# Patient Record
Sex: FEMALE | Race: AMERICAN INDIAN OR ALASKA NATIVE
[De-identification: names, ages, dates, MRNs, and addresses within clinical notes are randomized per-mention and may not be internally consistent; named-entity substitution may affect disease eponyms.]

---

## 2019-08-24 ENCOUNTER — HOSPITAL ENCOUNTER (EMERGENCY)
Dept: HOSPITAL 5 - ED | Age: 19
Discharge: HOME | End: 2019-08-24
Payer: COMMERCIAL

## 2019-08-24 VITALS — SYSTOLIC BLOOD PRESSURE: 148 MMHG | DIASTOLIC BLOOD PRESSURE: 88 MMHG

## 2019-08-24 DIAGNOSIS — Z20.2: Primary | ICD-10-CM

## 2019-08-24 LAB
BILIRUB UR QL STRIP: (no result)
BLOOD UR QL VISUAL: (no result)
MUCOUS THREADS #/AREA URNS HPF: (no result) /HPF
PH UR STRIP: 6 [PH] (ref 5–7)
PROT UR STRIP-MCNC: (no result) MG/DL
RBC #/AREA URNS HPF: 1 /HPF (ref 0–6)
UROBILINOGEN UR-MCNC: < 2 MG/DL (ref ?–2)
WBC #/AREA URNS HPF: 2 /HPF (ref 0–6)

## 2019-08-24 PROCEDURE — 81001 URINALYSIS AUTO W/SCOPE: CPT

## 2019-08-24 PROCEDURE — 96372 THER/PROPH/DIAG INJ SC/IM: CPT

## 2019-08-24 PROCEDURE — 99283 EMERGENCY DEPT VISIT LOW MDM: CPT

## 2019-08-24 PROCEDURE — 81025 URINE PREGNANCY TEST: CPT

## 2019-08-24 NOTE — EMERGENCY DEPARTMENT REPORT
Chief Complaint: Urogenital-Female


Stated Complaint: STD TESTING


Time Seen by Provider: 08/24/19 11:20





- HPI


History of Present Illness: 





This is a 19-year-old female who presents to the ED complaining of been exposed 

to chlamydia.  Patient states that she has multiple sexual partners with both 

female and male.  Patient states she was recently sexually active.  Patient 

denies dysuria, pelvic pain.  Patient states she does have a mild vaginal 

discharge.  She denies fevers chills nausea vomiting.  Patient states he treated





- ROS


Review of Systems: 


As noted in HPI








- Exam


Vital Signs: 


                                   Vital Signs











  08/24/19





  11:20


 


Temperature 98.6 F


 


Pulse Rate 109 H


 


Respiratory 16





Rate 


 


Blood Pressure 148/88


 


O2 Sat by Pulse 98





Oximetry 











Physical Exam: 





Gen. AAO3 no acute distress


Abdomen: Nontender to palpation.


MSE screening note: 


Focused history and physical exam performed.


Due to findings the following was ordered:











ED Medical Decision Making





- Medical Decision Making


19-year-old female presents with STD exposure.


ED course: Urinalysis and urine pregnancy test is negative


Patient received 250 mg of Rocephin, azithromycin 1 g, 


Discussed with patient possible STD due to exposure.


Discussed with patient findings and treatment


Discussed prophylaxis treatment patient is to abstain from sex 7-10 days as 

treatment.


Discussed patient partner knowledge and treatment.


Discussed the follow-up with the health department for further STD testing.


Patient's alert and oriented times 3. Vital signs are normal patient is in no 

acute  discharge.


Patient will be discharged home with instructions.








ED Disposition for MSE


Clinical Impression: 


 Exposure to STD





Disposition: DC-01 TO HOME OR SELFCARE


Is pt being admited?: No


Does the pt Need Aspirin: No


Condition: Stable


Instructions:  Sexually Transmitted Diseases (ED), Safe Sex (ED)


Additional Instructions: 


Make sure to follow up with the primary care physician as discussed.


Take all your medications as you've been prescribed.


If you have any worsening symptoms or develop new symptoms please return to ED 

immediately.


Prescriptions: 


metroNIDAZOLE [Flagyl] 500 mg PO ONCE #4 tab


Referrals: 


[facility] [Primary Care Provider] - 3-5 Days


[facility] [Outside] - 3-5 Days


[facility] [Outside] - 3-5 Days


Forms:  Accompanied Note, Work/School Release Form(ED)


Time of Disposition: 12:54

## 2019-08-24 NOTE — EVENT NOTE
ED Screening Note


Date of service: 08/24/19


Time: 11:21


ED Screening Note: 


18 y/o female comes in for vaginal discharge time 1 week.  Has a new sexually 

partner and wants to get STD check. 





This initial assessment/diagnostic orders/clinical plan/treatment(s) is/are 

subject to change based on patients health status, clinical progression and re-

assessment by fellow clinical providers in the ED. Further treatment and workup 

at subsequent clinical providers discretion. Patient/guardian urged not to elope

from the ED as their condition may be serious if not clinically assessed and 

managed. 





Initial orders include:

## 2019-10-08 ENCOUNTER — HOSPITAL ENCOUNTER (EMERGENCY)
Dept: HOSPITAL 5 - ED | Age: 19
Discharge: HOME | End: 2019-10-08
Payer: SELF-PAY

## 2019-10-08 VITALS — SYSTOLIC BLOOD PRESSURE: 138 MMHG | DIASTOLIC BLOOD PRESSURE: 86 MMHG

## 2019-10-08 DIAGNOSIS — N76.0: Primary | ICD-10-CM

## 2019-10-08 NOTE — EVENT NOTE
ED Screening Note


Date of service: 10/08/19


Time: 18:36


ED Screening Note: 


19 y o female presents to Ed cc of vaginal d/c with  odor





This initial assessment/diagnostic orders/clinical plan/treatment(s) is/are 

subject to change based on patients health status, clinical progression and 

re-assessment by fellow clinical providers in the ED. Further treatment and 

workup at subsequent clinical providers discretion. Patient/guardian urged not 

to elope from the ED as their condition may be serious if not clinically 

assessed and managed. 





Initial orders include:

## 2019-10-08 NOTE — EMERGENCY DEPARTMENT REPORT
Chief Complaint: Urogenital-Female


Stated Complaint: POSS BV


Time Seen by Provider: 10/08/19 18:36





- HPI


History of Present Illness: 





19 y o f presents with vag d/c x 2 days 


History of systems she has previous hx of BV sections.


Patient states that this is similar to her previous symptoms with a mild vaginal

discharge with no other


States that she sex her preference is other females.  She denies sex intercourse

with males


no symptoms


She denies dysuria, pelv pain or abd pain





- ROS


Review of Systems: 





As noted in HPI





- Exam


Vital Signs: 


                                   Vital Signs











  10/08/19





  18:36


 


Temperature 98.9 F


 


Pulse Rate 76


 


Respiratory 18





Rate 


 


Blood Pressure 138/86





[Right] 


 


O2 Sat by Pulse 97





Oximetry 











Physical Exam: 





General: Alert and oriented 3, ambulatory in no acute distress


Abdomen: Nontender to palpation no masses


MSE screening note: 


Focused history and physical exam performed.


Due to findings the following was ordered:











ED Medical Decision Making





- Medical Decision Making





19-year-old female presents with vaginitis


Patient has had recurrent vaginitis is stenosis since symptoms.  She is sexually

 active with male also normal risk of STDs.  She denies any symptoms at this 

time.  Patient will be discharged home with treatment Flagyl and referred to 

follow up with OB/GYN for follow-up.


Vital signs are normal she is acute distress.





ED Disposition for MSE


Clinical Impression: 


 Vaginitis





Disposition: DC-01 TO HOME OR SELFCARE


Is pt being admited?: No


Does the pt Need Aspirin: No


Condition: Stable


Instructions:  Bacterial Vaginosis (ED), Vaginitis (ED)


Additional Instructions: 


follow up with gyn


take medication as discussed


if you have any 


Prescriptions: 


Fluconazole [Diflucan TAB] 150 mg PO ONCE #1 tablet


metroNIDAZOLE [Flagyl TAB] 500 mg PO BID #14 tab


Referrals: 


 [facility] [Provider Group] - 3-5 Days


[facility] [Provider Group] - 3-5 Days


[facility] [Provider Group] - 3-5 Days


Forms:  Accompanied Note, Work/School Release Form(ED)


Time of Disposition: 18:50